# Patient Record
(demographics unavailable — no encounter records)

---

## 2024-11-08 NOTE — HISTORY OF PRESENT ILLNESS
[de-identified] : The patient is a 65-year-old F who presents to the office for the following concerns:  CTA recently completed with <24% stenosis  She would like cardiac IQ testing.   #sleep disturbance  #Heache 4 times per night. Snores at night. Daytime fatigue

## 2024-11-08 NOTE — HEALTH RISK ASSESSMENT
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

## 2024-11-08 NOTE — ASSESSMENT
[FreeTextEntry1] : The patient is a 65 year old F See detailed assessment above Labs drawn in office. Appropriate medication renewal(s) provided. Provided scripts for necessary imaging and/or referrals. Further management to be completed pending lab results and/or imaging studies. All of the patient's questions and concerns were answered in detail.

## 2024-11-08 NOTE — HEALTH RISK ASSESSMENT
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

## 2024-11-08 NOTE — HISTORY OF PRESENT ILLNESS
[de-identified] : The patient is a 65-year-old F who presents to the office for the following concerns:  CTA recently completed with <24% stenosis  She would like cardiac IQ testing.   #sleep disturbance  #Heache 4 times per night. Snores at night. Daytime fatigue

## 2025-05-20 NOTE — COUNSELING
[Behavioral health counseling provided] : Behavioral health counseling provided [AUDIT-C Screening administered and reviewed] : AUDIT-C Screening administered and reviewed [Encouraged to increase physical activity] : Encouraged to increase physical activity

## 2025-05-20 NOTE — HEALTH RISK ASSESSMENT
[Good] : ~his/her~  mood as  good [No] : In the past 12 months have you used drugs other than those required for medical reasons? No [No falls in past year] : Patient reported no falls in the past year [0] : 2) Feeling down, depressed, or hopeless: Not at all (0) [Patient reported mammogram was normal] : Patient reported mammogram was normal [Patient reported PAP Smear was normal] : Patient reported PAP Smear was normal [Patient reported colonoscopy was normal] : Patient reported colonoscopy was normal [HIV test declined] : HIV test declined [Hepatitis C test declined] : Hepatitis C test declined [Alone] : lives alone [Employed] : employed [Single] : single [Smoke Detector] : smoke detector [Carbon Monoxide Detector] : carbon monoxide detector [Seat Belt] :  uses seat belt [Sunscreen] : uses sunscreen [PHQ-2 Negative - No further assessment needed] : PHQ-2 Negative - No further assessment needed [Never] : Never [de-identified] : No [de-identified] : GI colonoscopy is scheduled fo Marilu 10, 2025, Cradiologist appointment coming up June 11, 2025 [Audit-CScore] : 0 [de-identified] : Active at work  [de-identified] : Healthy [[id number]] : 0 [de-identified] : No [Reports changes in hearing] : Reports no changes in hearing [Reports changes in vision] : Reports no changes in vision [Reports changes in dental health] : Reports no changes in dental health [MammogramDate] : 12/24 [PapSmearDate] : 04/24 [BoneDensityDate] : 09/24 [ColonoscopyDate] : 01/20 [ColonoscopyComments] : next colonoscopy already scheduled June10, 2025

## 2025-05-20 NOTE — HISTORY OF PRESENT ILLNESS
[FreeTextEntry1] : ARIELLA impaired balance/decreased strength [de-identified] : The patient is a 66 year old F who presents to the office for an annual wellness examination and follow up of chronic medical conditions. She reports compliance with all prescribed medical therapy and dietary restrictions.  #Right hip pain She endorses several months of right hip pain with pain into the groin. She has significant pain with abduction of the right leg. She notes the pain radiates down the right leg. She notes for the past few months she was in more stiff positions and standing for prolonged periods due to 24-hour shifts.    Routine Health maintenance (as applicable) Mammogram: 12/2024 Pap Smear: 4/2024 DEXA (65+): 9/2024 Colonoscopy (45+): 2020. Appt scheduled for 6/10   COVID vaccine series:  Flu: Tdap (19-64): UTD Shingles (50+, immunocompetent): declines PCV (>66yo/other conditions): declines

## 2025-05-20 NOTE — ASSESSMENT
[Vaccines Reviewed] : Immunizations reviewed today. Please see immunization details in the vaccine log within the immunization flowsheet.  [FreeTextEntry1] : The patient is a 66 year old F who presents to the office for an annual wellness examination - Fasting labs to screen for anemia, electrolyte disturbances, DM, lipid disorders, and additional metabolic disorders drawn in office - PHQ2 performed: negative - Age-appropriate Immunizations recommended  - Encouraged routine dental, vision, dermatology screenings & age-appropriate physical activity - Colonoscopy screening: UTD - Mammogram: UTD - Pap smear: UTD - DEXA: UTD    Where applicable: - Labs drawn in office. - Appropriate medication renewal(s) provided. - Provided scripts for necessary imaging and/or referrals.     Further management to be completed pending lab results and/or imaging studies. All of the patient's questions and concerns were answered in detail.

## 2025-06-02 NOTE — PHYSICAL EXAM
[DP] : dorsalis pedis 2+ and symmetric bilaterally [PT] : posterior tibial 2+ and symmetric bilaterally [Normal] : Alert and in no acute distress [Poor Appearance] : well-appearing [Acute Distress] : not in acute distress [Obese] : not obese [de-identified] : The patient has no respiratory distress. Mood and affect are normal. The patient is alert and oriented to person, place and time. Examination of the lumbar spine demonstrates tenderness to the right of the midline. There is mild muscle spasm. There is no deformity. Lumbar flexion is 90, right lateral flexion 10 and left lateral flexion 10. Straight leg raise test is negative. Lower extremity neurologic exam is intact with regard to sensation, motor function and deep tendon reflexes. Trendelenburg is negative.  She has pain and stiffness with motion of the right hip but not the left.  She has limited rotation of the right hip.  There is no pain with knee motion.  Calves are soft and nontender.  The skin is intact.  There is no lymphedema. [de-identified] : XR HIP RIGHT 2-3 VIEWS WITH PELVIS: 5/20/2025 12:12 PM CLINICAL HISTORY: Right hip pain. COMPARISON STUDY: None. TECHNIQUE: AP radiograph of the pelvis and AP and frog-leg lateral views of the right hip. FINDINGS: There is no acute fracture. No dislocation. There are severe degenerative changes at the right hip joint. There are mild degenerative changes of the left hip joint.. Mild degenerative changes at bilateral sacroiliac joints. The symphysis pubis is intact. No suspicious lytic or blastic osseous lesion. IMPRESSION: No acute fracture or dislocation. Severe osteoarthritis of the right hip joint. Mild osteoarthritis of the left hip joint. Mild osteoarthritis at bilateral sacroiliac joints. Electronically signed by Gerry Orr MD 5/20/2025 5:56 PM Ref. Physician: Allie Blackwell MD

## 2025-06-02 NOTE — HISTORY OF PRESENT ILLNESS
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 Denies trauma or injury. She complains of intermittent sharp pain in the hip worse with sitting with her legs crossed, sitting on the floor or abduction of the hip. She has tried heat, ice, Advil, Tylenol and TENs unit without much relief. She denies prior injuries.

## 2025-06-02 NOTE — DISCUSSION/SUMMARY
[de-identified] : The patient has osteoarthritis of the right hip.  I have discussed the pathology, natural history and treatment options with her.  Her options are to take over-the-counter medication, prescription NSAID.  If this is not helpful corticosteroid injection may be helpful.  She understands that all these measures are temporary.  She is not interested in them at this time.  I do not think physical therapy will help her hip much although she likely has osteoarthritis of her spine and may benefit from some core strengthening exercises.  The patient will exercise on her own at this time.  She will return if symptoms worsen.

## 2025-06-02 NOTE — DISCUSSION/SUMMARY
[de-identified] : The patient has osteoarthritis of the right hip.  I have discussed the pathology, natural history and treatment options with her.  Her options are to take over-the-counter medication, prescription NSAID.  If this is not helpful corticosteroid injection may be helpful.  She understands that all these measures are temporary.  She is not interested in them at this time.  I do not think physical therapy will help her hip much although she likely has osteoarthritis of her spine and may benefit from some core strengthening exercises.  The patient will exercise on her own at this time.  She will return if symptoms worsen.

## 2025-06-02 NOTE — HISTORY OF PRESENT ILLNESS
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 Denies trauma or injury. She complains of intermittent sharp pain in the hip worse with sitting with her legs crossed, sitting on the floor or abduction of the hip. She has tried heat, ice, Advil, Tylenol and TENs unit without much relief. She denies prior injuries.

## 2025-06-02 NOTE — PHYSICAL EXAM
[DP] : dorsalis pedis 2+ and symmetric bilaterally [PT] : posterior tibial 2+ and symmetric bilaterally [Normal] : Alert and in no acute distress [Poor Appearance] : well-appearing [Acute Distress] : not in acute distress [Obese] : not obese [de-identified] : The patient has no respiratory distress. Mood and affect are normal. The patient is alert and oriented to person, place and time. Examination of the lumbar spine demonstrates tenderness to the right of the midline. There is mild muscle spasm. There is no deformity. Lumbar flexion is 90, right lateral flexion 10 and left lateral flexion 10. Straight leg raise test is negative. Lower extremity neurologic exam is intact with regard to sensation, motor function and deep tendon reflexes. Trendelenburg is negative.  She has pain and stiffness with motion of the right hip but not the left.  She has limited rotation of the right hip.  There is no pain with knee motion.  Calves are soft and nontender.  The skin is intact.  There is no lymphedema. [de-identified] : XR HIP RIGHT 2-3 VIEWS WITH PELVIS: 5/20/2025 12:12 PM CLINICAL HISTORY: Right hip pain. COMPARISON STUDY: None. TECHNIQUE: AP radiograph of the pelvis and AP and frog-leg lateral views of the right hip. FINDINGS: There is no acute fracture. No dislocation. There are severe degenerative changes at the right hip joint. There are mild degenerative changes of the left hip joint.. Mild degenerative changes at bilateral sacroiliac joints. The symphysis pubis is intact. No suspicious lytic or blastic osseous lesion. IMPRESSION: No acute fracture or dislocation. Severe osteoarthritis of the right hip joint. Mild osteoarthritis of the left hip joint. Mild osteoarthritis at bilateral sacroiliac joints. Electronically signed by Gerry Orr MD 5/20/2025 5:56 PM Ref. Physician: Allie Blackwell MD

## 2025-06-11 NOTE — DISCUSSION/SUMMARY
[FreeTextEntry1] : EKG:NSR reviewed labs- Lp(a) high will enroll in Lpa study  if a candidate if is not would switch pravachol to Crestor for HLD continue toprol prn for PSVT- id recurs would refer for RFA get TTE

## 2025-06-11 NOTE — PHYSICAL EXAM
[Well Developed] : well developed [Well Nourished] : well nourished [Normal Conjunctiva] : normal conjunctiva [No Acute Distress] : no acute distress [Normal Venous Pressure] : normal venous pressure [No Carotid Bruit] : no carotid bruit [Normal S1, S2] : normal S1, S2 [No Murmur] : no murmur [No Rub] : no rub [No Gallop] : no gallop [Clear Lung Fields] : clear lung fields [Good Air Entry] : good air entry [No Respiratory Distress] : no respiratory distress  [Soft] : abdomen soft [Non Tender] : non-tender [Normal Gait] : normal gait [No Edema] : no edema [No Cyanosis] : no cyanosis [No Clubbing] : no clubbing [No Rash] : no rash [Moves all extremities] : moves all extremities [Normal Speech] : normal speech [Alert and Oriented] : alert and oriented

## 2025-06-11 NOTE — HISTORY OF PRESENT ILLNESS
[FreeTextEntry1] : had COVID and developed SVT 160bpm- spontaneously converted after 6 hours- very few brief episodes since- takes Lopressor prn- hasn't taken in awhile- no cp/sob